# Patient Record
Sex: FEMALE | Race: WHITE | NOT HISPANIC OR LATINO | URBAN - METROPOLITAN AREA
[De-identification: names, ages, dates, MRNs, and addresses within clinical notes are randomized per-mention and may not be internally consistent; named-entity substitution may affect disease eponyms.]

---

## 2022-11-28 ENCOUNTER — TELEPHONE (OUTPATIENT)
Dept: HEMATOLOGY ONCOLOGY | Facility: CLINIC | Age: 87
End: 2022-11-28

## 2022-11-28 NOTE — TELEPHONE ENCOUNTER
Patient calling in attempt to establish care  Patient is aware that her biopsy revealed malignancy, but is unsure of the type of cancer she has or any information regarding her diagnosis  I advised that she has her records from her current oncologist faxed to us as well as a referral so that we can determine what patient needs to be seen for and which provider she can be seen by  Patient voiced understanding, confirmed our correct fax and phone number, and will touch base once her records have been sent

## 2022-11-28 NOTE — TELEPHONE ENCOUNTER
Patient calling to check on the status of her records being sent from out of network  No records received in Right Fax  Patient states she will request they be faxed again   Confirmed Right Fax number with patient

## 2022-11-30 ENCOUNTER — DOCUMENTATION (OUTPATIENT)
Dept: HEMATOLOGY ONCOLOGY | Facility: CLINIC | Age: 87
End: 2022-11-30

## 2022-11-30 ENCOUNTER — TELEPHONE (OUTPATIENT)
Dept: SURGICAL ONCOLOGY | Facility: CLINIC | Age: 87
End: 2022-11-30

## 2022-11-30 ENCOUNTER — TELEPHONE (OUTPATIENT)
Dept: HEMATOLOGY ONCOLOGY | Facility: CLINIC | Age: 87
End: 2022-11-30

## 2022-11-30 NOTE — TELEPHONE ENCOUNTER
Patient has called previously to check if records faxed from [facility]  Records found on RightFAX sent 11/29  Patient requested Dr Harshal Strange who is not in this department  Transferred to Dr Harshal Strange

## 2022-11-30 NOTE — PROGRESS NOTES
Intake received, chart reviewed for need of external records      Outside records requested from: [facility]   Pathology completed on:10/31/2022  Fax:[phone]  Phone:[phone]  Imaging completed on:N/A    Fax:  Phone:

## 2022-12-01 ENCOUNTER — DOCUMENTATION (OUTPATIENT)
Dept: HEMATOLOGY ONCOLOGY | Facility: CLINIC | Age: 87
End: 2022-12-01

## 2022-12-01 NOTE — PROGRESS NOTES
Intake received, chart reviewed for need of external records      Outside records requested from:[facility]   Pathology completed on:  Fax:  Phone:  Imaging completed on:10/27/2022  Fax:[phone]  Phone:

## 2022-12-02 ENCOUNTER — CONSULT (OUTPATIENT)
Dept: HEMATOLOGY ONCOLOGY | Facility: CLINIC | Age: 87
End: 2022-12-02

## 2022-12-02 VITALS
HEART RATE: 72 BPM | BODY MASS INDEX: 27.38 KG/M2 | DIASTOLIC BLOOD PRESSURE: 62 MMHG | RESPIRATION RATE: 16 BRPM | HEIGHT: 61 IN | TEMPERATURE: 98.4 F | SYSTOLIC BLOOD PRESSURE: 120 MMHG | WEIGHT: 145 LBS | OXYGEN SATURATION: 98 %

## 2022-12-02 DIAGNOSIS — R53.0 NEOPLASTIC (MALIGNANT) RELATED FATIGUE: ICD-10-CM

## 2022-12-02 DIAGNOSIS — E44.0 MODERATE PROTEIN-CALORIE MALNUTRITION (HCC): ICD-10-CM

## 2022-12-02 DIAGNOSIS — C78.6 PERITONEAL METASTASES (HCC): ICD-10-CM

## 2022-12-02 DIAGNOSIS — G89.3 CANCER ASSOCIATED PAIN: ICD-10-CM

## 2022-12-02 DIAGNOSIS — C18.9 METASTATIC COLON CANCER IN FEMALE (HCC): Primary | ICD-10-CM

## 2022-12-02 RX ORDER — RIVAROXABAN 15 MG/1
15 TABLET, FILM COATED ORAL
COMMUNITY
Start: 2022-12-01

## 2022-12-02 RX ORDER — ASPIRIN 81 MG/1
TABLET ORAL
COMMUNITY

## 2022-12-02 RX ORDER — UBIDECARENONE 100 MG
CAPSULE ORAL
COMMUNITY

## 2022-12-02 RX ORDER — OXYCODONE HCL 10 MG/1
5 TABLET, FILM COATED, EXTENDED RELEASE ORAL AS NEEDED
COMMUNITY

## 2022-12-02 RX ORDER — LEVOTHYROXINE SODIUM 0.05 MG/1
50 TABLET ORAL EVERY MORNING
COMMUNITY
Start: 2022-11-20

## 2022-12-02 RX ORDER — PRAVASTATIN SODIUM 10 MG
TABLET ORAL
COMMUNITY
Start: 2022-12-01

## 2022-12-02 NOTE — PROGRESS NOTES
[facility] - Hematology & Medical Oncology  Outpatient Visit Encounter Note      Quirino Fabian 81 y o  female [id number] Date:  12/2/2022        SUBJECTIVE      Quirino Fabian is a 81 y o  here for new consultation with me today  The patient is referred by Pascale Machuca MD and the reason for consultation is C80 1 (ICD-10-CM) - Adenocarcinoma ([facility] [address] )    In review of the chart and talking with the patient, she has been previously established at 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 that was 4 4 cm in largest dimension with invasion of the visceral peritoneum  She was given a designation of pathological stage T4 N0 clinical M0  She was found to have loss of MLH1 and PMS2 for mismatch repair testing but negative for MLH1 with B Felipe wild-type  After surgery, there was no adjuvant chemotherapy  In October 2022 she went to the emergency room with back pain and diagnostic workup showed a 11 2 cm large retroperitoneal mass with extension to the left para-aortic with central necrosis with a left peritoneal cavity mass at 8 cm near the spleen  A biopsy performed October 31, 2022 confirmed adenocarcinoma that was favoring the patient's history of primary colonic malignancy  She is here with her brother  She told me that she is seeking to transfer her oncological care to [facility]  She complains of back pain that is being managed by the current pain regimen  Denies any fevers chills nausea vomiting chest abdominal pain or blood in the stool  She says that she has noticed decrease in energy and is not able to walk as far as she was before  I have reviewed the relevant past medical, surgical, social and family history  I have also reviewed allergies and medications for this patient      Review of Systems  Review of Systems   All other systems reviewed and are negative  OBJECTIVE     Physical Exam  Vitals:    12/02/22 0933   BP: 120/62   BP Location: Left arm   Patient Position: Sitting   Cuff Size: Adult   Pulse: 72   Resp: 16   Temp: 98 4 °F (36 9 °C)   TempSrc: Temporal   SpO2: 98%   Weight: 65 8 kg (145 lb)   Height: 5' 1" (1 549 m)       Physical Exam  Constitutional:       General: She is not in acute distress  Comments: Old thin and frail    Eyes:      Conjunctiva/sclera: Conjunctivae normal    Cardiovascular:      Rate and Rhythm: Normal rate  Pulmonary:      Effort: No respiratory distress  Abdominal:      General: There is no distension  Musculoskeletal:         General: No swelling  Cervical back: Neck supple  Skin:     Coloration: Skin is not jaundiced  Neurological:      General: No focal deficit present  Mental Status: She is oriented to person, place, and time  Mental status is at baseline  Imaging  Relevant imaging reviewed in chart    Labs  Relevant labs reviewed in chart   ASSESSMENT & PLAN      Diagnosis ICD-10-CM Associated Orders   1  Metastatic colon cancer in female [facility])  C18 9 Ambulatory referral to Radiation Oncology      2  Moderate protein-calorie malnutrition (HCC)  E44 0       3  Peritoneal metastases ([facility] [address] )  C78 6       4  Cancer associated pain  G89 3       5  Neoplastic (malignant) related fatigue  R53 0             81 y o  female with ECOG 2 diagnosed with [id number] G3 poorly differentiated sigmoid colonic adenocarcinoma 2019 status post surgery  Per documentation, she did not receive adjuvant chemotherapy  Per patient, she was not on any surveillance  In October 2022, after experiencing intractable back pain she was diagnosed with recurrent metastatic biopsy-proven colonic adenocarcinoma with 2 distinct abdominal and peritoneal cavity metastases      In review of her overall clinical status in oncological history, I reviewed the available NCCN guideline management options  I explained to her that she has an incurable metastatic malignancy and goal of therapy would be with a palliative intent  Given her overall status and health, I recommend management with first-line FOLFIRI pending next generation sequencing information submitted by her oncologist at [address] [place] [address]  The patient and her brother, who is 1 of the primary caregivers asked about oral formulations of systemic therapy  I told him that keep cytarabine is an applicable option for this type of diagnosis but given her age her comorbidities and her overall clinical status, the risks outweigh the benefits and hence I would not recommended  This subsequently inquired about ETHOS machine that she read in a newspaper clipping  In my opinion, the patient will not benefit from radiation therapy as systemic antineoplastic therapy is indicated  After understanding the limitations of radiation therapy and the applicability of it, she told me that she wishes to meet with the Radiation Oncology to understand her options because she would prefer radiation therapy over chemotherapy despite understanding the role of these types of cancer therapies in her cancer setting  In respecting patient's independent informed medical decision ability, a referral has been placed in  She told me that she prefers to make an office appointment after she understands all of her options and hence declined the offer to have a follow-up be made for her upon checkout today  All questions were answered to the patient's satisfaction during this encounter  They appreciated and thanked me for spending time with them  The patient knows the contact information for our office and know to reach out for any relevant concerns related to this encounter  For all other listed problems and medical diagnosis in his chart - they are managed by PCP and/or other specialists, which patient acknowledges        Dr Claudetta Shoulder Diamante Sherman MD  Hematology & Medical Oncology

## 2022-12-05 ENCOUNTER — DOCUMENTATION (OUTPATIENT)
Dept: HEMATOLOGY ONCOLOGY | Facility: CLINIC | Age: 87
End: 2022-12-05

## 2022-12-05 NOTE — PROGRESS NOTES
Records received from outside facility  Outside Pathology/Images records received from : [facility]     Records sent to pathology  attention Maria L Catalan or Bridget Nesbitt    Note routed to team   Yes

## 2022-12-06 ENCOUNTER — PATIENT OUTREACH (OUTPATIENT)
Dept: HEMATOLOGY ONCOLOGY | Facility: CLINIC | Age: 87
End: 2022-12-06

## 2022-12-06 ENCOUNTER — PATIENT OUTREACH (OUTPATIENT)
Dept: CASE MANAGEMENT | Facility: OTHER | Age: 87
End: 2022-12-06

## 2022-12-06 DIAGNOSIS — C18.9 METASTATIC COLON CANCER IN FEMALE (HCC): Primary | ICD-10-CM

## 2022-12-06 NOTE — PROGRESS NOTES
Phone outreach, no answer, left VM, stated my name, job title, and reason for call, provided my phone number, waiting for call back from the pt

## 2022-12-06 NOTE — PROGRESS NOTES
Rcvd call back from the pt, very pleasant woman, NN initial outreach and discussion complete, spoke to the pt about her dx, her visit w Dr Abdirahman Fitzgerald, and appt that is sched w rad onc tomorrow to discuss radiation therapy  I explained that she would be meeting w Dr Kaykay Robertson RN first for about a half hour followed by the physician, we also confirmed her appt time since she was not sure 1230 vs 1PM  The pt met w Dr Abdirahman Fitzgerald and it was rec that the pt undergo chemotherapy but she is really interested in hearing about radiation therapy avail to treat her CA  The pt did not opt for chemo at this time  We completed the general assessment, referrals have been placed for onc SW as well as pall care  I explained to her SW contribution to care team as well as pall care, explained distinction btw pall vs hospice, pt reports sig back pain hector at night (8/10) and she is managing w opioids, I asked if pt has constipation she said she is at this time, I highlighted the importance of her taking a stool softener and that this too would be something pall care would address, she does report fatigue as well  Pt lives w a friend, her brother will be assisting her w transportation and will be at her consult visit liam w Dr Theodor Ormond  I provided the pt w my contact info as well as Astrid Cavazos, she knows to call us if she has any ?s or concerns that come up, instructed her to leave a VM w her name and  if she cannot get a hold of us  She thanked me for my call

## 2022-12-07 ENCOUNTER — CLINICAL SUPPORT (OUTPATIENT)
Dept: RADIATION ONCOLOGY | Facility: CLINIC | Age: 87
End: 2022-12-07
Attending: INTERNAL MEDICINE

## 2022-12-07 ENCOUNTER — RADIATION ONCOLOGY CONSULT (OUTPATIENT)
Dept: RADIATION ONCOLOGY | Facility: CLINIC | Age: 87
End: 2022-12-07

## 2022-12-07 VITALS
HEART RATE: 88 BPM | BODY MASS INDEX: 21.27 KG/M2 | OXYGEN SATURATION: 90 % | HEIGHT: 61 IN | TEMPERATURE: 98 F | WEIGHT: 112.66 LBS | DIASTOLIC BLOOD PRESSURE: 74 MMHG | SYSTOLIC BLOOD PRESSURE: 125 MMHG

## 2022-12-07 DIAGNOSIS — G89.3 CANCER ASSOCIATED PAIN: ICD-10-CM

## 2022-12-07 DIAGNOSIS — C78.6 PERITONEAL METASTASES (HCC): Primary | ICD-10-CM

## 2022-12-07 DIAGNOSIS — C18.9 METASTATIC COLON CANCER IN FEMALE (HCC): ICD-10-CM

## 2022-12-07 RX ORDER — OXYCODONE HYDROCHLORIDE 5 MG/1
5 TABLET ORAL EVERY 6 HOURS PRN
Qty: 40 TABLET | Refills: 0 | Status: SHIPPED | OUTPATIENT
Start: 2022-12-07

## 2022-12-07 RX ORDER — OXYCODONE HYDROCHLORIDE 5 MG/1
5 CAPSULE ORAL EVERY 12 HOURS PRN
COMMUNITY

## 2022-12-07 NOTE — PROGRESS NOTES
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 that was 4 4 cm in largest dimension with invasion of the visceral peritoneum  She was given a designation of pathological stage T4 N0 clinical M0  She was found to have loss of MLH1 and PMS2 for mismatch repair testing but negative for MLH1 with B Felipe wild-type  After surgery, there was no adjuvant chemotherapy  October 2022 ,she went to the emergency room with back pain and diagnostic workup showed a 11 2 cm large retroperitoneal mass with extension to the left para-aortic with central necrosis with a left peritoneal cavity mass at 8 cm near the spleen  A biopsy performed October 31, 2022 confirmed adenocarcinoma that was favoring the patient's history of primary colonic malignancy  10 27 22  CT abdomen and pelvis [facility])  Impression:   Large retroperitoneal mass in left side with necrosis, and   additional left-sided peritoneal mass with necrosis  Findings suggest metastatic   disease  Postoperative changes  Small gallstones  No evidence of bowel   obstruction  No other right hemidiaphragm and right basal atelectasis  10/31/22  Surgical Pathology [facility])  ABDOMINAL MASS, CORE BIOPSY:   Adenocarcinoma with extensive necrosis    11 7 22 [facility]/Med Onc  Dr Tracey Mendoza:  Reviewed imaging and path w/pt and brother  Recurrent disease/pt is fairly symptomatic  Rx-Oxycodone to pharmacy  Discussed palliative chemo/pt interested in pursuing txmt  Given her age and performance, options are limited  Check PDL1-may be  candidate for targeted txmt     I am concerned that the pt lives alone which increases her risk of untoward side effects related to txmt  Should the pt decided to pursue txmt, recommended she consider moving to a nursing facility  Pt ask for two weeks to think this over and make living arrangements  Check PDL1  Chemo was discussed, her brother seemed interested but the pt was on the fence about it  Hospice would be reasonable as well  Consider [facility] consult as well          12 2 22  Dewey Calix: Yunior Scott has an incurable metastatic malignancy and goal of therapy would be with a palliative intent  Given her overall status and health, recommend management with first-line FOLFIRI pending next generation sequencing information submitted by her oncologist at [facility]  The patient and her brother, who is 1 of the primary caregivers asked about oral formulations of systemic therapy  I told him that keep cytarabine is an applicable option for this type of diagnosis but given her age her comorbidities and her overall clinical status, the risks outweigh the benefits and hence I would not recommended  This subsequently inquired about ETHOS machine that she read in a newspaper clipping  In my opinion, the patient will not benefit from radiation therapy as systemic antineoplastic therapy is indicated  After understanding the limitations of radiation therapy and the applicability of it, she told me that she wishes to meet with the Radiation Oncology to understand her options because she would prefer radiation therapy over chemotherapy despite understanding the role of these types of cancer therapies in her cancer setting"  "In respecting patient's independent informed medical decision ability, a referral has been placed in  She told me that she prefers to make an office appointment after she understands all of her options and hence declined the offer to have a follow-up be made for her upon checkout today"  Appt  Oncology History   Metastatic colon cancer in female [facility])   12/2/2022 Initial Diagnosis    Metastatic colon cancer in female [facility])     Peritoneal metastases ([facility] [address] )   11/11/2022 Biopsy      Date Ordered: 11/11/2022   Date Reported: 11/13/2022   Pathologist: JAKY West  Electronically Released by: JAKY West  Electronically Released at: [employer]  [facility], [address] [place] [address]   : Sebas Samuels, PhD           DIAGNOSIS  :   ABDOMINAL MASS, CORE BIOPSY:   Adenocarcinoma with extensive necrosis  12/2/2022 Initial Diagnosis    Peritoneal metastases ([facility] [address] )         Review of Systems:  Review of Systems   Constitutional: Positive for activity change, appetite change, fatigue and unexpected weight change  Negative for chills and fever  HENT: Negative  Eyes: Negative  Glasses   Respiratory: Negative  Negative for cough, choking and shortness of breath  Cardiovascular: Negative for chest pain, palpitations and leg swelling  Gastrointestinal: Positive for abdominal distention, constipation (since taking pain medication) and nausea (feels Canary Greulich off and on)  Negative for abdominal pain and vomiting  Endocrine: Negative  Genitourinary: Negative for dysuria, frequency and urgency  Musculoskeletal: Positive for back pain (lower back and radiates down left leg  Worse when lying down) and gait problem (occasionally uses walker at home and wheelchair for long distances)  Negative for arthralgias  Skin: Negative  Allergic/Immunologic: Negative for environmental allergies and food allergies  Neurological: Positive for weakness  Negative for tremors, light-headedness, numbness and headaches  Hematological: Bruises/bleeds easily  Psychiatric/Behavioral: Positive for sleep disturbance (pain disturbed by sleeping )         Clinical Trial: no    Pregnancy test needed:  no      Prior Radiation: none      MST Completed    Implantable Devices (Port, Pacemaker, pain stimulator) denies    Hip Replacement: none      [unfilled]  Health Maintenance   Topic Date Due   • Medicare Annual Wellness Visit (AWV)  Never done   • Hepatitis B Vaccine (1 of 3 - 3-dose series) Never done   • COVID-19 Vaccine (1) Never done   • Depression Screening  Never done   • BMI: Followup Plan  Never done   • Fall Risk  Never done   • Urinary Incontinence Screening  Never done   • Influenza Vaccine (1) 09/01/2022   • BMI: Adult  12/02/2023   • Pneumococcal Vaccine: 65+ Years  Completed   • HIB Vaccine  Aged Out   • IPV Vaccine  Aged Out   • Hepatitis A Vaccine  Aged Out   • Meningococcal ACWY Vaccine  Aged Out   • HPV Vaccine  Aged Out     No past medical history on file  No past surgical history on file  No family history on file  Social History     Tobacco Use   • Smoking status: Never   • Smokeless tobacco: Never        Current Outpatient Medications:   •  aspirin (ECOTRIN LOW STRENGTH) 81 mg EC tablet, Take by mouth, Disp: , Rfl:   •  B Complex Vitamins (B COMPLEX PO), Take by mouth, Disp: , Rfl:   •  Cholecalciferol 125 MCG (5000 UT) capsule, Take by mouth, Disp: , Rfl:   •  co-enzyme Q-10 100 mg capsule, Take by mouth, Disp: , Rfl:   •  levothyroxine 50 mcg tablet, Take 50 mcg by mouth every morning, Disp: , Rfl:   •  MAGNESIUM PO, Take by mouth, Disp: , Rfl:   •  metoprolol tartrate (LOPRESSOR) 25 mg tablet, Take 12 5 mg by mouth 2 (two) times a day, Disp: , Rfl:   •  oxyCODONE (OxyCONTIN) 10 mg 12 hr tablet, Take 5 mg by mouth as needed for severe pain, Disp: , Rfl:   •  POTASSIUM PO, Take by mouth, Disp: , Rfl:   •  pravastatin (PRAVACHOL) 10 mg tablet, , Disp: , Rfl:   •  SELENIUM PO, Take by mouth, Disp: , Rfl:   •  Xarelto 15 MG tablet, Take 15 mg by mouth daily with breakfast, Disp: , Rfl:   Allergies   Allergen Reactions   • Atorvastatin Other (See Comments)     Elevated LFTs      There were no vitals filed for this visit

## 2022-12-07 NOTE — PROGRESS NOTES
-------------------------------------------------------Consultation - Radiation Oncology      ZYU:[id number] : 1935  Encounter: [id number]  Patient Information: [address]  Chief Complaint   Patient presents with   • Consult     Cancer Staging   No matching staging information was found for the patient  History of Present Illness   Layton Dill is a 81y o  year old female who presents with for Radiation Oncology Consult  She is referred by Dr Keiry Perrin  Dx: Peritoneal Metastases      Patient is an 80yo female with previously established care at [place] [address] with the last office visit on 2022  She was 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 that was 4 4 cm in largest dimension with invasion of the visceral peritoneum   She was given a designation of pathological stage T4 N0 clinical M0  Nas Aryan was found to have loss of MLH1 and PMS2 for mismatch repair testing but negative for MLH1 with B Felipe wild-type   After surgery, there was no adjuvant chemotherapy      2022 ,she went to the emergency room with back pain that was present for several months and diagnostic workup showed a 11 2 cm large retroperitoneal mass with extension to the left para-aortic with central necrosis with a left peritoneal cavity mass at 8 cm near the spleen   A biopsy performed 2022 confirmed adenocarcinoma that was favoring the patient's history of primary colonic malignancy        10 27 22  CT abdomen and pelvis [facility])  Impression:   Large retroperitoneal mass in left side with necrosis, and   additional left-sided peritoneal mass with necrosis  Findings suggest metastatic   disease  Postoperative changes  Small gallstones  No evidence of bowel   obstruction   No other right hemidiaphragm and right basal atelectasis       10/31/22  Surgical Pathology [facility])  ABDOMINAL MASS, CORE BIOPSY:   Adenocarcinoma with extensive necrosis     11 7 22 [facility]/Med Onc  Dr Pearl Sic:  Reviewed imaging and path w/pt and brother  Recurrent disease/pt is fairly symptomatic  Rx-Oxycodone to pharmacy  Discussed palliative chemo/pt interested in pursuing txmt  Given her age and performance, options are limited  Check PDL1-may be  candidate for targeted txmt  I am concerned that the pt lives alone which increases her risk of untoward side effects related to txmt  Should the pt decided to pursue txmt, recommended she consider moving to a nursing facility  Pt ask for two weeks to think this over and make living arrangements  Check PDL1  Chemo was discussed, her brother seemed interested but the pt was on the fence about it  Hospice would be reasonable as well  Consider [facility] consult as well         12  2 22  [facility]: Parish Patterson has an incurable metastatic malignancy and goal of therapy would be with a palliative intent  Kallana Handler her overall status and health, recommend management with first-line FOLFIRI pending next generation sequencing information submitted by her oncologist at [facility]    The patient and her brother, who is 1 of the primary caregivers asked about oral formulations of systemic therapy   I told him that keep cytarabine is an applicable option for this type of diagnosis but given her age her comorbidities and her overall clinical status, the risks outweigh the benefits and hence I would not recommended   This subsequently inquired about [employer] machine that she read in a newspaper clipping  Eddie Lights my opinion, the patient will not benefit from radiation therapy as systemic antineoplastic therapy is indicated   After understanding the limitations of radiation therapy and the applicability of it, she told me that she wishes to meet with the Radiation Oncology to understand her options because she would prefer radiation therapy over chemotherapy despite understanding the role of these types of cancer therapies in her cancer setting"      "In respecting patient's independent informed medical decision ability, a referral has been placed in   She told me that she prefers to make an office appointment after she understands all of her options and hence declined the offer to have a follow-up be made for her upon checkout today"      She is seen for consultation today with her brother who is also her caregiver  She lives with a friend  She denies any previous history of radiation therapy and no prior chemotherapy  She has had colon carcinoma status post surgery as outlined above in 2019  She reports lower back pain now only when she is laying down flat  This often shoots down the left lower extremity  She has been using a walker  She is having no nausea nor vomiting  She has a decrease in appetite  Sometimes she feels queasy after meals  She has oxycodone 5 mg tablets that she was initially using at half a tablet which was not helpful and now she has been taking the whole tablet  She reports some constipation  She has ran out of the oxycodone 5 mg and this was refilled  She was told to take Senokot along with the oxycodone to prevent constipation  Kiko Cartwright Upcoming Appt  She needs to schedule an appointment for chemotherapy with Dr Dessie Nageotte   She also needs to schedule an appoint with palliative care  Historical Information   Oncology History   Metastatic colon cancer in female [facility])   12/2/2022 Initial Diagnosis    Metastatic colon cancer in [facility])     Peritoneal metastases ([facility] )   11/11/2022 Biopsy      Date Ordered: 11/11/2022   Date Reported: 11/13/2022   Pathologist: JAKY Marley  Electronically Released by: JAKY Marley  Electronically Released at: [employer]  [facility] [address]   : Nafisa Martinez, PhD           DIAGNOSIS  :   ABDOMINAL MASS, CORE BIOPSY:   Adenocarcinoma with extensive necrosis  12/2/2022 Initial Diagnosis    Peritoneal metastases ([facility] [address] )           Past Medical History:   Diagnosis Date   • Colon cancer ([facility] )    • [facility] 2019   • Elevated cholesterol    • Hypertension      Past Surgical History:   Procedure Laterality Date   • CATARACT EXTRACTION W/ INTRAOCULAR LENS IMPLANT Left    • COLON SURGERY     • MITRAL VALVE REPAIR N/A    • MITRAL VALVE REPLACEMENT         Family History   Problem Relation Age of Onset   • Lung cancer Brother        Social History   Social History     Substance and Sexual Activity   Alcohol Use Not Currently     Social History     Substance and Sexual Activity   Drug Use Not on file     Social History     Tobacco Use   Smoking Status Never   Smokeless Tobacco Never     Meds/Allergies     Current Outpatient Medications:   •  aspirin (ECOTRIN LOW STRENGTH) 81 mg EC tablet, Take by mouth, Disp: , Rfl:   •  B Complex Vitamins (B COMPLEX PO), Take by mouth, Disp: , Rfl:   •  Cholecalciferol 125 MCG (5000 UT) capsule, Take by mouth, Disp: , Rfl:   •  co-enzyme Q-10 100 mg capsule, Take by mouth, Disp: , Rfl:   •  levothyroxine 50 mcg tablet, Take 50 mcg by mouth every morning, Disp: , Rfl:   •  MAGNESIUM PO, Take by mouth, Disp: , Rfl:   •  metoprolol tartrate (LOPRESSOR) 25 mg tablet, Take 12 5 mg by mouth 2 (two) times a day, Disp: , Rfl:   •  oxyCODONE (OXY-IR) 5 MG capsule, Take 5 mg by mouth every 12 (twelve) hours as needed for moderate pain Takes 2 5 mg every 12 hours, Disp: , Rfl:   •  oxyCODONE (ROXICODONE) 5 immediate release tablet, Take 1 tablet (5 mg total) by mouth every 6 (six) hours as needed for moderate pain Max Daily Amount: 20 mg, Disp: 40 tablet, Rfl: 0  •  pravastatin (PRAVACHOL) 10 mg tablet, , Disp: , Rfl:   •  SELENIUM PO, Take by mouth, Disp: , Rfl:   •  Xarelto 15 MG tablet, Take 15 mg by mouth daily with breakfast, Disp: , Rfl:   •  oxyCODONE (OxyCONTIN) 10 mg 12 hr tablet, Take 5 mg by mouth as needed for severe pain, Disp: , Rfl:   •  POTASSIUM PO, Take by mouth (Patient not taking: Reported on 12/7/2022), Disp: , Rfl:   Allergies   Allergen Reactions   • Atorvastatin Other (See Comments)     Elevated LFTs     Review of Systems   Constitutional: Positive for activity change, appetite change, fatigue and unexpected weight change  Negative for chills and fever  HENT: Negative  Eyes: Negative  Glasses   Respiratory: Negative  Negative for cough, choking and shortness of breath  Cardiovascular: Negative for chest pain, palpitations and leg swelling  Gastrointestinal: Positive for abdominal distention, constipation (since taking pain medication) and nausea (feels Sylvia Serene off and on)  Negative for abdominal pain and vomiting  Endocrine: Negative  Genitourinary: Negative for dysuria, frequency and urgency  Musculoskeletal: Positive for back pain (lower back and radiates down left leg  Worse when lying down) and gait problem (occasionally uses walker at home and wheelchair for long distances)  Negative for arthralgias  Skin: Negative  Allergic/Immunologic: Negative for environmental allergies and food allergies  Neurological: Positive for weakness  Negative for tremors, light-headedness, numbness and headaches  Hematological: Bruises/bleeds easily  Psychiatric/Behavioral: Positive for sleep disturbance (pain disturbed by sleeping  )       Clinical Trial: no     Pregnancy test needed:  no     Prior Radiation: none    OBJECTIVE:   /74 (BP Location: Left arm, Patient Position: Sitting, Cuff Size: Standard)   Pulse 88   Temp 98 °F (36 7 °C) (Temporal)   Ht 5' 1" (1 549 m)   Wt 51 1 kg (112 lb 10 5 oz)   SpO2 90%   BMI 21 29 kg/m²   Pain Assessment:  4  Performance Status: ECOG/Zubrod/WHO: 2 - Symptomatic, <50% confined to bed    Physical Exam  Vitals and nursing note reviewed  Constitutional:       General: She is not in acute distress  Appearance: She is well-developed   She is ill-appearing  She is not diaphoretic  Comments: She appears frail  HENT:      Head: Normocephalic and atraumatic  Mouth/Throat:      Pharynx: No oropharyngeal exudate  Eyes:      General: No scleral icterus  Conjunctiva/sclera: Conjunctivae normal       Pupils: Pupils are equal, round, and reactive to light  Neck:      Thyroid: No thyromegaly  Trachea: No tracheal deviation  Cardiovascular:      Rate and Rhythm: Normal rate and regular rhythm  Heart sounds: Normal heart sounds  Pulmonary:      Effort: Pulmonary effort is normal  No respiratory distress  Breath sounds: Normal breath sounds  No stridor  No wheezing, rhonchi or rales  Chest:      Chest wall: No tenderness  Abdominal:      General: Bowel sounds are normal  There is no distension  Palpations: Abdomen is soft  There is no mass  Tenderness: There is no abdominal tenderness  There is no guarding or rebound  Hernia: No hernia is present  Musculoskeletal:         General: No tenderness  Normal range of motion  Cervical back: Normal range of motion and neck supple  Lymphadenopathy:      Cervical: No cervical adenopathy  Upper Body:      Right upper body: No supraclavicular adenopathy  Left upper body: No supraclavicular adenopathy  Lower Body: No right inguinal adenopathy  No left inguinal adenopathy  Skin:     General: Skin is warm and dry  Coloration: Skin is not pale  Findings: No erythema or rash  Neurological:      General: No focal deficit present  Mental Status: She is alert and oriented to person, place, and time  Cranial Nerves: No cranial nerve deficit  Sensory: No sensory deficit  Coordination: Coordination normal    Psychiatric:         Mood and Affect: Mood normal          Behavior: Behavior normal          Thought Content:  Thought content normal          Judgment: Judgment normal         RESULTS  Lab Results    Chemistry    No results found for: NA, K, CL, CO2, BUN, CREATININE No results found for: CALCIUM, ALKPHOS, AST, ALT, BILITOT     No results found for: WBC, HGB, HCT, MCV, PLT    Imaging Studies: See Above    Pathology: See Above    ASSESSMENT  1  Peritoneal metastases (HCC)  oxyCODONE (ROXICODONE) 5 immediate release tablet      2  Metastatic colon cancer in female [facility])  Ambulatory referral to Radiation Oncology    oxyCODONE (ROXICODONE) 5 immediate release tablet      3  Cancer associated pain  oxyCODONE (ROXICODONE) 5 immediate release tablet        Cancer Staging   No matching staging information was found for the patient  PLAN/DISCUSSION  No orders of the defined types were placed in this encounter  Haresh Michael is a 81y o  year old female with a history of sigmoid colon adenocarcinoma grade 3/poorly differentiated diagnosed in 2019 with a 4 4 cm primary with invasion of the visceral peritoneum  She is status postresection for T4 N0 M0 disease  She did not receive any postoperative adjuvant chemotherapy  She also has never received any radiation therapy  She now unfortunately has evidence of recurrent disease with metastasis to a large retroperitoneal mass on the left side with necrosis and a second additional left-sided peritoneal mass seen on CAT scan  This was biopsied October 31, 2022 at [facility] which came back as adenocarcinoma with extensive necrosis  She was seen by Dr Nader Lees in medical oncology at [facility] on November 7 and by Dr Lennox Palomino in medical oncology here at [facility] on December 2, 2022  She has recurrent metastatic malignancy that is not curable  She was offered palliative chemotherapy  She prefers an oral regimen versus intravenous chemotherapy  She is here today to obtain an opinion regarding palliative radiation therapy on our new [employer] machine    I explained to her and her brother that she has extensive disease involving her left abdomen/retroperitoneum and that she would not be a candidate for radiation therapy which would be quite a large field of treatment to include her 2/3-3/4 of her abdomen  She would have significant side effects from this type of treatment without significant benefit  We recommend strongly that she consider systemic treatment  If she decides against systemic treatment, then she should go on hospice care  We recommended she be seen by palliative care  Her oxycodone 5 mg tablets were refilled today with #40  She will be calling medical oncology to schedule follow-up and discuss her systemic treatment options if she decides on treatment  She will not be seen here for any further appointments  Katie Simeon MD  12/7/2022,2:14 PM      Portions of the record may have been created with voice recognition software  Occasional wrong word or "sound a like" substitutions may have occurred due to the inherent limitations of voice recognition software  Read the chart carefully and recognize, using context, where substitutions have occurred

## 2022-12-09 ENCOUNTER — DOCUMENTATION (OUTPATIENT)
Dept: NUTRITION | Facility: CLINIC | Age: 87
End: 2022-12-09

## 2022-12-09 ENCOUNTER — PATIENT OUTREACH (OUTPATIENT)
Dept: CASE MANAGEMENT | Facility: OTHER | Age: 87
End: 2022-12-09

## 2022-12-09 NOTE — PROGRESS NOTES
OSW placed outreach TC to pt this afternoon  OSW received pts VM  Detailed VM was left requesting a return TC  Contact information was provided

## 2022-12-09 NOTE — PROGRESS NOTES
Received notification by [facility] RN, Brionna Lopez , on 12/7/22 that pt has triggered for oncology nutrition care (reason for referral: Malnutrition Screening Tool (MST) Triggers: scored a 2 indicating 2-13# (0 9-6 kg) recent wt loss and is eating poorly due to a decreased appetite  (Date of MST: 12/7/22))  Per chart review, Janee Meyers has decided to not undergo treatment at this time  If Janee Meyers decides to undergo treatment at [facility] in the future, will establish care at that time

## 2022-12-12 ENCOUNTER — PATIENT OUTREACH (OUTPATIENT)
Dept: CASE MANAGEMENT | Facility: OTHER | Age: 87
End: 2022-12-12

## 2022-12-12 NOTE — PROGRESS NOTES
OSW performed chart review  Per chart notes the pt has chosen not to pursue treatment, therefore the referral will be closed

## 2022-12-14 ENCOUNTER — TELEPHONE (OUTPATIENT)
Dept: HEMATOLOGY ONCOLOGY | Facility: CLINIC | Age: 87
End: 2022-12-14

## 2022-12-14 NOTE — TELEPHONE ENCOUNTER
Scheduling Appointment SEND TO [facility]    Who Is Calling to Schedule Patient    Doctor Dr Meredith Sinha   Date and Time 1/12/23  11am   Reason for scheduling appointment Needs follow up to go over somethings    Patient verbalized understanding    Yes

## 2022-12-29 ENCOUNTER — TELEPHONE (OUTPATIENT)
Dept: HEMATOLOGY ONCOLOGY | Facility: CLINIC | Age: 87
End: 2022-12-29

## 2022-12-29 NOTE — TELEPHONE ENCOUNTER
Appointment Confirmation (to confirm pre existing appointments - ONLY)  No need to route   Appointment with  Dr Julieta Mi   Appointment date & time  1/12/23   Location [place]   Patient verbilized Understanding  yes

## 2023-01-05 ENCOUNTER — TELEPHONE (OUTPATIENT)
Dept: HEMATOLOGY ONCOLOGY | Facility: CLINIC | Age: 88
End: 2023-01-05

## 2023-01-05 NOTE — TELEPHONE ENCOUNTER
CALL RETURN FORM   Reason for patient call? Goldie Wilson,  at [facility] to clarify whether or not patient should continue her follow up visits now that she has decided to be put on hospice     Patient's primary oncologist? Dr Terence Mckeon   Name of person the patient was calling for? Dr Terence Mckeon   Any additional information to add, if applicable? N/A   Informed patient that the message will be forwarded to the team and someone will get back to them as soon as possible    Did you relay this information to the patient?  Yes

## 2023-01-09 ENCOUNTER — TELEPHONE (OUTPATIENT)
Dept: HEMATOLOGY ONCOLOGY | Facility: CLINIC | Age: 88
End: 2023-01-09

## 2023-01-09 NOTE — TELEPHONE ENCOUNTER
Appointment Cancellation Or Reschedule     Person calling in Rehan Hodges  from [facility]    If other than patient calling, are they listed on the communication consent form? N/A   Provider Dr Flip Delgado   Office Visit Date and Time 01/12/23 11AM   Office Visit Location [place]   Did patient want to reschedule their office appointment? If so, when was it scheduled to? No   Did you have STAR scheduled for this appointment? N/A   Do you need STAR set up for your new appointment? If yes, please send to "PATIENT RIDESHARE" pool for STAR rescheduling N/A   If you are cancelling appointment, can we notify STAR to cancel ride? If yes, please send to "PATIENT RIDESHARE" pool for STAR to cancel service No   Is this patient calling to reschedule an infusion appointment? N/A   When is their next infusion appointment? No   Is this patient a Chemo patient? N/A   Reason for Cancellation or Reschedule Patient is on hospice        If the patient is a treatment patient, please route this to the office nurse  If the patient is not on treatment, please route to the office MA  If the patient is a surgical oncology patient, please route to surg/onc clinical pool